# Patient Record
Sex: FEMALE | Race: WHITE
[De-identification: names, ages, dates, MRNs, and addresses within clinical notes are randomized per-mention and may not be internally consistent; named-entity substitution may affect disease eponyms.]

---

## 2019-04-21 ENCOUNTER — HOSPITAL ENCOUNTER (EMERGENCY)
Dept: HOSPITAL 4 - SED | Age: 74
LOS: 1 days | Discharge: HOME | End: 2019-04-22
Payer: COMMERCIAL

## 2019-04-21 VITALS — WEIGHT: 140 LBS | BODY MASS INDEX: 25.76 KG/M2 | HEIGHT: 62 IN

## 2019-04-21 VITALS — SYSTOLIC BLOOD PRESSURE: 169 MMHG

## 2019-04-21 DIAGNOSIS — D69.6: ICD-10-CM

## 2019-04-21 DIAGNOSIS — R73.9: ICD-10-CM

## 2019-04-21 DIAGNOSIS — R00.0: ICD-10-CM

## 2019-04-21 DIAGNOSIS — M79.7: ICD-10-CM

## 2019-04-21 DIAGNOSIS — N39.0: Primary | ICD-10-CM

## 2019-04-21 DIAGNOSIS — R03.0: ICD-10-CM

## 2019-04-21 LAB
ALBUMIN SERPL BCP-MCNC: 3.8 G/DL (ref 3.4–4.8)
ALT SERPL W P-5'-P-CCNC: 45 U/L (ref 12–78)
ANION GAP SERPL CALCULATED.3IONS-SCNC: 9 MMOL/L (ref 5–15)
APPEARANCE UR: (no result)
AST SERPL W P-5'-P-CCNC: 42 U/L (ref 10–37)
BACTERIA URNS QL MICRO: (no result) /HPF
BASOPHILS # BLD AUTO: 0 K/UL (ref 0–0.2)
BASOPHILS NFR BLD AUTO: 0.1 % (ref 0–2)
BILIRUB SERPL-MCNC: 1 MG/DL (ref 0–1)
BILIRUB UR QL STRIP: NEGATIVE
BUN SERPL-MCNC: 12 MG/DL (ref 8–21)
CALCIUM SERPL-MCNC: 9 MG/DL (ref 8.4–11)
CHLORIDE SERPL-SCNC: 105 MMOL/L (ref 98–107)
COLOR UR: YELLOW
CREAT SERPL-MCNC: 0.96 MG/DL (ref 0.55–1.3)
EOSINOPHIL # BLD AUTO: 0.1 K/UL (ref 0–0.4)
EOSINOPHIL NFR BLD AUTO: 1.6 % (ref 0–4)
ERYTHROCYTE [DISTWIDTH] IN BLOOD BY AUTOMATED COUNT: 12.9 % (ref 9–15)
GFR SERPL CREATININE-BSD FRML MDRD: (no result) ML/MIN (ref 90–?)
GLUCOSE SERPL-MCNC: 157 MG/DL (ref 70–99)
GLUCOSE UR STRIP-MCNC: NEGATIVE MG/DL
HCT VFR BLD AUTO: 40.2 % (ref 36–48)
HGB BLD-MCNC: 13.5 G/DL (ref 12–16)
HGB UR QL STRIP: (no result)
INR PPP: 1.1 (ref 0.8–1.2)
KETONES UR STRIP-MCNC: NEGATIVE MG/DL
LEUKOCYTE ESTERASE UR QL STRIP: (no result)
LYMPHOCYTES # BLD AUTO: 0.3 K/UL (ref 1–5.5)
LYMPHOCYTES NFR BLD AUTO: 5.4 % (ref 20.5–51.5)
MCH RBC QN AUTO: 31 PG (ref 27–31)
MCHC RBC AUTO-ENTMCNC: 34 % (ref 32–36)
MCV RBC AUTO: 91 FL (ref 79–98)
MONOCYTES # BLD MANUAL: 0.2 K/UL (ref 0–1)
MONOCYTES # BLD MANUAL: 3.5 % (ref 1.7–9.3)
NEUTROPHILS # BLD AUTO: 4.8 K/UL (ref 1.8–7.7)
NEUTROPHILS NFR BLD AUTO: 89.4 % (ref 40–70)
NITRITE UR QL STRIP: POSITIVE
PH UR STRIP: 5 [PH] (ref 5–8)
PLATELET # BLD AUTO: 63 K/UL (ref 130–430)
POTASSIUM SERPL-SCNC: 4.5 MMOL/L (ref 3.5–5.1)
PROT UR QL STRIP: NEGATIVE
PROTHROMBIN TIME: 10.8 SECS (ref 9.5–12.5)
RBC # BLD AUTO: 4.41 MIL/UL (ref 4.2–6.2)
RBC #/AREA URNS HPF: (no result) /HPF (ref 0–3)
SODIUM SERPLBLD-SCNC: 140 MMOL/L (ref 136–145)
SP GR UR STRIP: 1.02 (ref 1–1.03)
UROBILINOGEN UR STRIP-MCNC: 0.2 MG/DL (ref 0.2–1)
WBC # BLD AUTO: 5.4 K/UL (ref 4.8–10.8)
WBC #/AREA URNS HPF: (no result) /HPF (ref 0–3)

## 2019-04-21 PROCEDURE — 96365 THER/PROPH/DIAG IV INF INIT: CPT

## 2019-04-21 PROCEDURE — 71045 X-RAY EXAM CHEST 1 VIEW: CPT

## 2019-04-21 PROCEDURE — 36415 COLL VENOUS BLD VENIPUNCTURE: CPT

## 2019-04-21 PROCEDURE — 85730 THROMBOPLASTIN TIME PARTIAL: CPT

## 2019-04-21 PROCEDURE — 81000 URINALYSIS NONAUTO W/SCOPE: CPT

## 2019-04-21 PROCEDURE — 83605 ASSAY OF LACTIC ACID: CPT

## 2019-04-21 PROCEDURE — 84484 ASSAY OF TROPONIN QUANT: CPT

## 2019-04-21 PROCEDURE — 87186 SC STD MICRODIL/AGAR DIL: CPT

## 2019-04-21 PROCEDURE — 85025 COMPLETE CBC W/AUTO DIFF WBC: CPT

## 2019-04-21 PROCEDURE — 85610 PROTHROMBIN TIME: CPT

## 2019-04-21 PROCEDURE — 87040 BLOOD CULTURE FOR BACTERIA: CPT

## 2019-04-21 PROCEDURE — 93005 ELECTROCARDIOGRAM TRACING: CPT

## 2019-04-21 PROCEDURE — 87086 URINE CULTURE/COLONY COUNT: CPT

## 2019-04-21 PROCEDURE — 99284 EMERGENCY DEPT VISIT MOD MDM: CPT

## 2019-04-21 PROCEDURE — 80053 COMPREHEN METABOLIC PANEL: CPT

## 2019-04-21 NOTE — NUR
Pt AAOx4 presents to ED via wheelchair c/o weakness, trembling, shaking s/p 
dinner with family possibily related to fibromyalgia flare up. Pt denies pain 
at the moment. Skin pink dry and warm, breathing even and unlabored. No other 
injuries/complaints per pt/notd. Will continue to monitor.

## 2019-04-21 NOTE — NUR
Pt ambulated with nonslip shoes and steady gait to bathroom to provide urine 
sample. Urine sample sent to lab. Pt assisted back to bed to a position of 
comfort.

## 2019-04-21 NOTE — NUR
Placed in room 07  . Placed on cardiac monitor, blood pressure machine and 
pulse oximeter. To gown for exam. Side rails up.

## 2019-04-22 VITALS — SYSTOLIC BLOOD PRESSURE: 148 MMHG

## 2019-04-22 NOTE — NUR
Patient given written and verbal discharge instructions and verbalizes 
understanding.  ER MD discussed with patient the results and treatment 
provided. Patient in stable condition. ID arm band removed. IV catheter removed 
intact and dressing applied, no active bleeding. Rx of Cipro given. Patient 
educated on pain management and to follow up with PMD. Pain Scale 2/10 
tolerable to patient. Opportunity for questions provided and answered. 
Medication side effect fact sheet provided.

## 2019-04-24 ENCOUNTER — HOSPITAL ENCOUNTER (EMERGENCY)
Dept: HOSPITAL 4 - SED | Age: 74
Discharge: HOME | End: 2019-04-24
Payer: COMMERCIAL

## 2019-04-24 VITALS — SYSTOLIC BLOOD PRESSURE: 158 MMHG

## 2019-04-24 VITALS — SYSTOLIC BLOOD PRESSURE: 162 MMHG

## 2019-04-24 VITALS — HEIGHT: 62 IN | BODY MASS INDEX: 25.76 KG/M2 | WEIGHT: 140 LBS

## 2019-04-24 DIAGNOSIS — M79.7: ICD-10-CM

## 2019-04-24 DIAGNOSIS — M54.5: Primary | ICD-10-CM

## 2019-04-24 DIAGNOSIS — R03.0: ICD-10-CM

## 2019-04-24 LAB
ALBUMIN SERPL BCP-MCNC: 3.7 G/DL (ref 3.4–4.8)
ALT SERPL W P-5'-P-CCNC: 55 U/L (ref 12–78)
ANION GAP SERPL CALCULATED.3IONS-SCNC: 7 MMOL/L (ref 5–15)
APPEARANCE UR: CLEAR
AST SERPL W P-5'-P-CCNC: 29 U/L (ref 10–37)
BACTERIA URNS QL MICRO: (no result) /HPF
BASOPHILS # BLD AUTO: 0 K/UL (ref 0–0.2)
BASOPHILS NFR BLD AUTO: 0.5 % (ref 0–2)
BILIRUB SERPL-MCNC: 1.7 MG/DL (ref 0–1)
BILIRUB UR QL STRIP: NEGATIVE
BUN SERPL-MCNC: 11 MG/DL (ref 8–21)
CALCIUM SERPL-MCNC: 10 MG/DL (ref 8.4–11)
CHLORIDE SERPL-SCNC: 103 MMOL/L (ref 98–107)
COARSE GRAN CASTS URNS QL MICRO: (no result) /LPF
COLOR UR: YELLOW
CREAT SERPL-MCNC: 1.19 MG/DL (ref 0.55–1.3)
EOSINOPHIL # BLD AUTO: 0 K/UL (ref 0–0.4)
EOSINOPHIL NFR BLD AUTO: 1.2 % (ref 0–4)
ERYTHROCYTE [DISTWIDTH] IN BLOOD BY AUTOMATED COUNT: 12.5 % (ref 9–15)
GFR SERPL CREATININE-BSD FRML MDRD: (no result) ML/MIN (ref 90–?)
GLUCOSE SERPL-MCNC: 122 MG/DL (ref 70–99)
GLUCOSE UR STRIP-MCNC: NEGATIVE MG/DL
HCT VFR BLD AUTO: 39.1 % (ref 36–48)
HGB BLD-MCNC: 13.1 G/DL (ref 12–16)
HGB UR QL STRIP: (no result)
KETONES UR STRIP-MCNC: NEGATIVE MG/DL
LEUKOCYTE ESTERASE UR QL STRIP: NEGATIVE
LIPASE SERPL-CCNC: 153 U/L (ref 73–393)
LYMPHOCYTES # BLD AUTO: 0.7 K/UL (ref 1–5.5)
LYMPHOCYTES NFR BLD AUTO: 17.8 % (ref 20.5–51.5)
MCH RBC QN AUTO: 31 PG (ref 27–31)
MCHC RBC AUTO-ENTMCNC: 34 % (ref 32–36)
MCV RBC AUTO: 91 FL (ref 79–98)
MONOCYTES # BLD MANUAL: 0.7 K/UL (ref 0–1)
MONOCYTES # BLD MANUAL: 16.9 % (ref 1.7–9.3)
NEUTROPHILS # BLD AUTO: 2.5 K/UL (ref 1.8–7.7)
NEUTROPHILS NFR BLD AUTO: 63.6 % (ref 40–70)
NITRITE UR QL STRIP: NEGATIVE
PH UR STRIP: 6 [PH] (ref 5–8)
PLATELET # BLD AUTO: 59 K/UL (ref 130–430)
POTASSIUM SERPL-SCNC: 4.5 MMOL/L (ref 3.5–5.1)
PROT UR QL STRIP: NEGATIVE
RBC # BLD AUTO: 4.29 MIL/UL (ref 4.2–6.2)
RBC #/AREA URNS HPF: (no result) /HPF (ref 0–3)
SODIUM SERPLBLD-SCNC: 136 MMOL/L (ref 136–145)
SP GR UR STRIP: <1.005 (ref 1–1.03)
UROBILINOGEN UR STRIP-MCNC: 0.2 MG/DL (ref 0.2–1)
WBC # BLD AUTO: 3.9 K/UL (ref 4.8–10.8)
WBC #/AREA URNS HPF: (no result) /HPF (ref 0–3)

## 2019-04-24 PROCEDURE — 85025 COMPLETE CBC W/AUTO DIFF WBC: CPT

## 2019-04-24 PROCEDURE — 81000 URINALYSIS NONAUTO W/SCOPE: CPT

## 2019-04-24 PROCEDURE — 83690 ASSAY OF LIPASE: CPT

## 2019-04-24 PROCEDURE — 96375 TX/PRO/DX INJ NEW DRUG ADDON: CPT

## 2019-04-24 PROCEDURE — 36415 COLL VENOUS BLD VENIPUNCTURE: CPT

## 2019-04-24 PROCEDURE — 99283 EMERGENCY DEPT VISIT LOW MDM: CPT

## 2019-04-24 PROCEDURE — 96374 THER/PROPH/DIAG INJ IV PUSH: CPT

## 2019-04-24 PROCEDURE — 80053 COMPREHEN METABOLIC PANEL: CPT

## 2019-04-24 NOTE — NUR
Patient given written and verbal discharge instructions and verbalizes 
understanding.  ER MD discussed with patient the results and treatment 
provided. Patient in stable condition. ID arm band removed. IV catheter removed 
intact and dressing applied, no active bleeding.

NO Rx given. Patient educated on pain management and to follow up with PMD. 
Pain Scale 0/10.

Opportunity for questions provided and answered. Medication side effect fact 
sheet provided.

## 2019-04-24 NOTE — NUR
PATIENT VERBALIZED RELIEF FROM NAUSEA. NO EPISODES OF VOMITING.



PATIENT VERBALIZED RELIEF FROM LEFT FLANK PAIN. PAIN = 2/10; TOLERABLE AS 
VERBALIZED. NO OBJECTIVE S/SX OF PAIN. 



REST AND RELAXATION ENCOURAGED. PT RESTING COMFORTABLY ON EXAM BED. PT IN NO 
ACUTE DISTRESS.

## 2019-04-24 NOTE — NUR
MORPHINE AND ZOFRAN ADMINISTERED. TOELRATED WELL. PLEASE SEE EMAR FOR DETAILS. 



IV FLUIDS OF NS INITIATED. TOLERATED WELL. PLEASE SEE EMAR FOR DETAILS.

## 2019-09-20 ENCOUNTER — OFFICE (OUTPATIENT)
Dept: URBAN - METROPOLITAN AREA CLINIC 94 | Facility: CLINIC | Age: 74
Setting detail: OPHTHALMOLOGY
End: 2019-09-20
Payer: COMMERCIAL

## 2019-09-20 DIAGNOSIS — Z96.1: ICD-10-CM

## 2019-09-20 DIAGNOSIS — H40.1111: ICD-10-CM

## 2019-09-20 DIAGNOSIS — H04.123: ICD-10-CM

## 2019-09-20 DIAGNOSIS — H35.3131: ICD-10-CM

## 2019-09-20 DIAGNOSIS — E11.9: ICD-10-CM

## 2019-09-20 DIAGNOSIS — H40.1121: ICD-10-CM

## 2019-09-20 PROCEDURE — 92014 COMPRE OPH EXAM EST PT 1/>: CPT | Performed by: OPHTHALMOLOGY

## 2019-09-20 PROCEDURE — 92020 GONIOSCOPY: CPT | Performed by: OPHTHALMOLOGY

## 2019-09-20 PROCEDURE — 92133 CPTRZD OPH DX IMG PST SGM ON: CPT | Performed by: OPHTHALMOLOGY

## 2019-09-20 ASSESSMENT — KERATOMETRY
OD_K1POWER_DIOPTERS: 44.25
OS_K2POWER_DIOPTERS: 45.75
OS_AXISANGLE_DEGREES: 095
OD_AXISANGLE_DEGREES: 138
OS_K1POWER_DIOPTERS: 45.00
METHOD_AUTO_MANUAL: AUTO
OD_K2POWER_DIOPTERS: 44.50

## 2019-09-20 ASSESSMENT — REFRACTION_AUTOREFRACTION
OD_SPHERE: -0.25
OS_AXIS: 031
OS_CYLINDER: -0.25
OD_CYLINDER: -0.50
OS_SPHERE: -0.75
OD_AXIS: 092

## 2019-09-20 ASSESSMENT — AXIALLENGTH_DERIVED
OS_AL: 23.2492
OD_AL: 23.466

## 2019-09-20 ASSESSMENT — VISUAL ACUITY
OS_BCVA: 20/20-1
OD_BCVA: 20/30-

## 2019-09-20 ASSESSMENT — REFRACTION_MANIFEST
OD_VA1: 20/
OU_VA: 20/
OS_VA3: 20/
OD_VA3: 20/
OS_VA3: 20/
OS_VA1: 20/
OD_VA2: 20/
OD_VA1: 20/
OS_VA2: 20/
OD_VA3: 20/
OU_VA: 20/
OD_VA2: 20/
OS_VA1: 20/
OS_VA2: 20/

## 2019-09-20 ASSESSMENT — SUPERFICIAL PUNCTATE KERATITIS (SPK)
OD_SPK: 2+
OS_SPK: 1+

## 2019-09-20 ASSESSMENT — REFRACTION_CURRENTRX
OS_OVR_VA: 20/
OS_OVR_VA: 20/
OD_OVR_VA: 20/
OD_OVR_VA: 20/
OS_OVR_VA: 20/
OD_OVR_VA: 20/

## 2019-09-20 ASSESSMENT — SPHEQUIV_DERIVED
OS_SPHEQUIV: -0.875
OD_SPHEQUIV: -0.5

## 2019-09-20 ASSESSMENT — CONFRONTATIONAL VISUAL FIELD TEST (CVF)
OD_FINDINGS: FULL
OS_FINDINGS: FULL

## 2019-10-18 ENCOUNTER — OFFICE (OUTPATIENT)
Dept: URBAN - METROPOLITAN AREA CLINIC 94 | Facility: CLINIC | Age: 74
Setting detail: OPHTHALMOLOGY
End: 2019-10-18
Payer: COMMERCIAL

## 2019-10-18 DIAGNOSIS — H04.123: ICD-10-CM

## 2019-10-18 DIAGNOSIS — Z96.1: ICD-10-CM

## 2019-10-18 DIAGNOSIS — E11.9: ICD-10-CM

## 2019-10-18 DIAGNOSIS — H40.1121: ICD-10-CM

## 2019-10-18 DIAGNOSIS — H40.1111: ICD-10-CM

## 2019-10-18 DIAGNOSIS — H35.3131: ICD-10-CM

## 2019-10-18 PROCEDURE — 92012 INTRM OPH EXAM EST PATIENT: CPT | Performed by: OPHTHALMOLOGY

## 2019-10-18 ASSESSMENT — KERATOMETRY
OS_K1POWER_DIOPTERS: 45.00
OD_K2POWER_DIOPTERS: 44.50
OD_K1POWER_DIOPTERS: 44.25
OS_AXISANGLE_DEGREES: 095
METHOD_AUTO_MANUAL: AUTO
OS_K2POWER_DIOPTERS: 45.75
OD_AXISANGLE_DEGREES: 138

## 2019-10-18 ASSESSMENT — CONFRONTATIONAL VISUAL FIELD TEST (CVF)
OS_FINDINGS: FULL
OD_FINDINGS: FULL

## 2019-10-18 ASSESSMENT — REFRACTION_MANIFEST
OD_VA2: 20/
OD_VA1: 20/
OU_VA: 20/
OS_VA1: 20/
OS_VA2: 20/
OS_VA3: 20/
OD_VA3: 20/

## 2019-10-18 ASSESSMENT — REFRACTION_AUTOREFRACTION
OS_CYLINDER: -0.25
OS_AXIS: 031
OD_CYLINDER: -0.50
OD_AXIS: 092
OD_SPHERE: -0.25
OS_SPHERE: -0.75

## 2019-10-18 ASSESSMENT — SUPERFICIAL PUNCTATE KERATITIS (SPK)
OD_SPK: 2+
OS_SPK: 1+

## 2019-10-18 ASSESSMENT — SPHEQUIV_DERIVED
OD_SPHEQUIV: -0.5
OS_SPHEQUIV: -0.875

## 2019-10-18 ASSESSMENT — AXIALLENGTH_DERIVED
OS_AL: 23.2492
OD_AL: 23.466

## 2019-10-18 ASSESSMENT — VISUAL ACUITY
OD_BCVA: 20/25-1
OS_BCVA: 20/20-1

## 2020-01-10 ENCOUNTER — OFFICE (OUTPATIENT)
Dept: URBAN - METROPOLITAN AREA CLINIC 94 | Facility: CLINIC | Age: 75
Setting detail: OPHTHALMOLOGY
End: 2020-01-10
Payer: COMMERCIAL

## 2020-01-10 DIAGNOSIS — Z96.1: ICD-10-CM

## 2020-01-10 DIAGNOSIS — H40.1111: ICD-10-CM

## 2020-01-10 DIAGNOSIS — H40.1121: ICD-10-CM

## 2020-01-10 DIAGNOSIS — H35.3131: ICD-10-CM

## 2020-01-10 DIAGNOSIS — H04.123: ICD-10-CM

## 2020-01-10 DIAGNOSIS — E11.9: ICD-10-CM

## 2020-01-10 PROCEDURE — 92014 COMPRE OPH EXAM EST PT 1/>: CPT | Performed by: OPHTHALMOLOGY

## 2020-01-10 PROCEDURE — 92083 EXTENDED VISUAL FIELD XM: CPT | Performed by: OPHTHALMOLOGY

## 2020-01-10 ASSESSMENT — KERATOMETRY
OS_K2POWER_DIOPTERS: 45.50
METHOD_AUTO_MANUAL: AUTO
OD_K2POWER_DIOPTERS: 44.75
OS_K1POWER_DIOPTERS: 45.25
OS_AXISANGLE_DEGREES: 127
OD_AXISANGLE_DEGREES: 145
OD_K1POWER_DIOPTERS: 44.50

## 2020-01-10 ASSESSMENT — REFRACTION_MANIFEST
OU_VA: 20/
OS_VA1: 20/
OS_VA3: 20/
OD_VA2: 20/
OD_VA3: 20/
OS_VA2: 20/
OD_VA1: 20/

## 2020-01-10 ASSESSMENT — AXIALLENGTH_DERIVED
OD_AL: 23.2803
OS_AL: 23.2492

## 2020-01-10 ASSESSMENT — VISUAL ACUITY
OD_BCVA: 20/20-
OS_BCVA: 20/20-1

## 2020-01-10 ASSESSMENT — SUPERFICIAL PUNCTATE KERATITIS (SPK)
OD_SPK: 2+
OS_SPK: 1+

## 2020-01-10 ASSESSMENT — REFRACTION_AUTOREFRACTION
OS_AXIS: 048
OD_CYLINDER: -0.50
OS_SPHERE: -0.75
OD_AXIS: 093
OS_CYLINDER: -0.25
OD_SPHERE: 0.00

## 2020-01-10 ASSESSMENT — CONFRONTATIONAL VISUAL FIELD TEST (CVF)
OD_FINDINGS: FULL
OS_FINDINGS: FULL

## 2020-01-10 ASSESSMENT — SPHEQUIV_DERIVED
OS_SPHEQUIV: -0.875
OD_SPHEQUIV: -0.25

## 2021-07-07 ENCOUNTER — RX ONLY (RX ONLY)
Age: 76
End: 2021-07-07

## 2021-07-07 ENCOUNTER — OFFICE (OUTPATIENT)
Dept: URBAN - METROPOLITAN AREA CLINIC 35 | Facility: CLINIC | Age: 76
Setting detail: OPHTHALMOLOGY
End: 2021-07-07
Payer: MEDICAID

## 2021-07-07 DIAGNOSIS — H25.13: ICD-10-CM

## 2021-07-07 DIAGNOSIS — H11.153: ICD-10-CM

## 2021-07-07 DIAGNOSIS — E11.3293: ICD-10-CM

## 2021-07-07 DIAGNOSIS — H25.013: ICD-10-CM

## 2021-07-07 PROBLEM — H43.393 VITREOUS FLOATERS; BOTH EYES: Status: ACTIVE | Noted: 2021-07-07

## 2021-07-07 PROCEDURE — 92004 COMPRE OPH EXAM NEW PT 1/>: CPT | Performed by: OPHTHALMOLOGY

## 2021-07-07 PROCEDURE — 92250 FUNDUS PHOTOGRAPHY W/I&R: CPT | Performed by: OPHTHALMOLOGY

## 2021-07-07 ASSESSMENT — CONFRONTATIONAL VISUAL FIELD TEST (CVF)
OD_FINDINGS: FULL
OS_FINDINGS: FULL

## 2021-07-07 ASSESSMENT — AXIALLENGTH_DERIVED
OS_AL: 22.9398
OD_AL: 23.019
OS_AL: 22.7571
OD_AL: 23.0654

## 2021-07-07 ASSESSMENT — KERATOMETRY
OS_K2POWER_DIOPTERS: 44.00
OD_K2POWER_DIOPTERS: 43.50
OS_K1POWER_DIOPTERS: 44.00
OS_AXISANGLE_DEGREES: 090
OD_AXISANGLE_DEGREES: 014
OD_K1POWER_DIOPTERS: 43.25

## 2021-07-07 ASSESSMENT — TONOMETRY
OD_IOP_MMHG: 19
OS_IOP_MMHG: 19

## 2021-07-07 ASSESSMENT — SPHEQUIV_DERIVED
OD_SPHEQUIV: 1.625
OS_SPHEQUIV: 1.25
OD_SPHEQUIV: 1.5
OS_SPHEQUIV: 1.75

## 2021-07-07 ASSESSMENT — VISUAL ACUITY
OS_BCVA: 20/40
OD_BCVA: 20/40-1

## 2021-07-07 ASSESSMENT — REFRACTION_MANIFEST
OS_VA1: 20/25+2
OD_VA1: 20/25+1
OD_CYLINDER: +1.00
OS_AXIS: 015
OD_AXIS: 005
OS_CYLINDER: +1.00
OD_SPHERE: +1.00
OS_SPHERE: +0.75

## 2021-07-07 ASSESSMENT — REFRACTION_AUTOREFRACTION
OD_AXIS: 092
OD_SPHERE: +2.00
OS_AXIS: 101
OD_CYLINDER: -0.75
OS_SPHERE: +2.50
OS_CYLINDER: -1.50